# Patient Record
Sex: FEMALE | Race: BLACK OR AFRICAN AMERICAN | Employment: FULL TIME | URBAN - METROPOLITAN AREA
[De-identification: names, ages, dates, MRNs, and addresses within clinical notes are randomized per-mention and may not be internally consistent; named-entity substitution may affect disease eponyms.]

---

## 2017-05-30 ENCOUNTER — APPOINTMENT (OUTPATIENT)
Dept: PHYSICAL THERAPY | Age: 57
End: 2017-05-30

## 2017-06-07 ENCOUNTER — HOSPITAL ENCOUNTER (OUTPATIENT)
Dept: PHYSICAL THERAPY | Age: 57
Discharge: HOME | End: 2017-06-07
Payer: COMMERCIAL

## 2017-06-07 PROCEDURE — 97110 THERAPEUTIC EXERCISES: CPT | Performed by: PHYSICAL THERAPIST

## 2017-06-07 PROCEDURE — 97162 PT EVAL MOD COMPLEX 30 MIN: CPT | Performed by: PHYSICAL THERAPIST

## 2017-06-07 PROCEDURE — 97140 MANUAL THERAPY 1/> REGIONS: CPT | Performed by: PHYSICAL THERAPIST

## 2017-06-07 NOTE — PROGRESS NOTES
[facility]              [address]        [place], [address] [place]   [phone] fax    Plan of Care/ Statement of Necessity for Physical Therapy Services  Patient name: Adiel Capps Start of Care: 2017   Referral source: [place], MD : 1960    Medical Diagnosis: Headache [R51]  Cervicalgia [M54.2]   Onset Date: second week in May 2017    Treatment Diagnosis: cervicalgia   Prior Hospitalization: see medical history Provider#: [id number]   Medications: Verified on Patient summary List    Comorbidities: back pain, BMI > 30, HBP   Prior Level of Function: Independent w/ ADLs, not limited w/ neck pain or headaches     The Plan of Care and following information is based on the information from the initial evaluation. Assessment/ key information: Pt is a 62 y/o female w/ c/o increased pain in the neck and back of her head beginning the second week in May 2017 w/o known VIRI. Reports the pain gradually increased over the course of the day. Pain now increases w/ looking to the R and decreases w/ BC powder. Reports she had x-rays and it showed arthritis. Denies previous treatment for this condition and no history of neck injury or involvement. Pt presents sitting w/ rounded shoulders and slight L rotation in the cervical spine. C/S AROM flex and ext [facility] but painful at EROM, R SB 28 deg, L SB 35 deg, R rot 54 deg, L rot 69 deg. Cervical retraction limited 75% and painful. B shoulder AROM WFL but pulling is reported throughout the neck w/ all planes. Increased mm tightness and myofascial restriction throughout the cervical muscles and upper thoracic region. Poor cervical segmental mobility noted. Pt will benefit from skilled PT to address the deficits and progress with pt goals.      Evaluation Complexity History MEDIUM  Complexity : 1-2 comorbidities / personal factors will impact the outcome/ POC ; Examination MEDIUM Complexity : 3 Standardized tests and measures addressing body structure, function, activity limitation and / or participation in recreation  ;Presentation MEDIUM Complexity : Evolving with changing characteristics  ; Clinical Decision Making MEDIUM Complexity : FOTO score of 26-74  Overall Complexity Rating: MEDIUM  Problem List: pain affecting function, decrease ROM, decrease strength, decrease ADL/ functional abilitiies, decrease activity tolerance and decrease flexibility/ joint mobility   Treatment Plan may include any combination of the following: Therapeutic exercise, Therapeutic activities, Neuromuscular re-education, Physical agent/modality, Manual therapy, Patient education and Functional mobility training  Patient / Family readiness to learn indicated by: asking questions, trying to perform skills and interest  Persons(s) to be included in education: patient (P)  Barriers to Learning/Limitations: None  Patient Goal (s): anything to help  Patient Self Reported Health Status: fair  Rehabilitation Potential: good    Short Term Goals: To be accomplished in 1 weeks:  1. Pt will be independent and complaint w/ HEP to progress gains in PT.   2. Pt will increase sitting posture to good w/ no L rotation of the c/s for ease w/ checking blind spots while driving. Long Term Goals: To be accomplished in 4 weeks:  1. Pt will increase FOTO to > or = to 63 to demo improved function. 2. Pt will increase cervical AROM into R SB and R rotation by > or = to 8-10 deg for ease w/ driving. 3. Pt will increase cervical retraction by > or = to 25% for improved posture and decreased pain. 4. Pt will report > or = to 60% improvement in symptoms for ease w/ return to pain free ADLs. Frequency / Duration: Patient to be seen 3 times per week for 4 weeks.     Patient/ Caregiver education and instruction: Diagnosis, prognosis, activity modification and exercises   [x]  Plan of care has been reviewed with HARDEEP Vera, PT 6/7/2017 12:47 PM  ________________________________________________________________________    I certify that the above Therapy Services are being furnished while the patient is under my care. I agree with the treatment plan and certify that this therapy is necessary.     [de-identified] Signature:____________________  Date:____________Time: _________    Please sign and return to [facility] [address]        [place], [address] [place]   [phone] fax

## 2017-06-07 NOTE — PROGRESS NOTES
PT DAILY TREATMENT NOTE     Patient Name: Bailee Gregory  Date:2017  : 1960  [x]  Patient  Verified  Payor: Rocio Bello / Plan: Pura Roy / Product Type: HMO /    In time:1136  Out time:1206  Total Treatment Time (min): 30  Visit #: 1 of 12    Treatment Area: Headache [R51]  Cervicalgia [M54.2]    SUBJECTIVE  Pain Level (0-10 scale): 5/10  Any medication changes, allergies to medications, adverse drug reactions, diagnosis change, or new procedure performed?: [x] No    [] Yes (see summary sheet for update)  Subjective functional status/changes:   [] No changes reported  HPI: Pt c/o increased pain in the neck and back of her head beginning the second week in May 2017 w/o known VIRI. Reports the pain gradually increased over the course of the day. Pain now increases w/ looking to the R and decreases w/ BC powder. Reports she had x-rays and it showed arthritis. Denies previous treatment for this condition and no history of neck injury or involvement.      OBJECTIVE    Modality rationale: decrease pain and increase tissue extensibility to improve the patients ability to improve mobility and function   Min Type Additional Details    [] Estim:  []Unatt       []IFC  []Premod                        []Other:  []w/ice   []w/heat  Position:  Location:    [] Estim: []Att    []TENS instruct  []NMES                    []Other:  []w/US   []w/ice   []w/heat  Position:  Location:    []  Traction: [] Cervical       []Lumbar                       [] Prone          []Supine                       []Intermittent   []Continuous Lbs:  [] before manual  [] after manual    []  Ultrasound: []Continuous   [] Pulsed                           []1MHz   []3MHz W/cm2:  Location:    []  Iontophoresis with dexamethasone         Location: [] Take home patch   [] In clinic   8 (w/ HEP instruction) []  Ice     [x]  heat  []  Ice massage  []  Laser   []  Anodyne Position: supine  Location: neck    []  Laser with stim  []  Other: Position:  Location:    []  Vasopneumatic Device Pressure:       [] lo [] med [] hi   Temperature: [] lo [] med [] hi   [] Skin assessment post-treatment:  []intact []redness- no adverse reaction    []redness  adverse reaction:     12 min [x]Eval                  []Re-Eval       8 min Therapeutic Exercise:  [] See flow sheet : instructed in and demo'd HEP   Rationale: increase ROM, improve coordination and increase proprioception to improve the patients ability to decrease pain and improve activity tolerance     min Therapeutic Activity:  []  See flow sheet :   Rationale:  to improve the patients ability to       min Neuromuscular Re-education:  []  See flow sheet :   Rationale:   to improve the patients ability to     10 min Manual Therapy:  TPR/STM/MFR to B UT, LS, R scalenes, paraspinals, gentle c/s PA's, manual str to R UT   Rationale: decrease pain, increase ROM, increase tissue extensibility, decrease trigger points and increase postural awareness to improve mobiltiy and activity tolerance      min Gait Training:  ___ feet with ___ device on level surfaces with ___ level of assist   Rationale: With   [] TE   [] TA   [] neuro   [] other: Patient Education: [x] Review HEP    [] Progressed/Changed HEP based on:   [] positioning   [] body mechanics   [] transfers   [] heat/ice application    [] other:      Other Objective/Functional Measures: Pt presents sitting w/ rounded shoulders and slight L rotation in the cervical spine. C/S AROM flex and ext [facility] but painful at EROM, R SB 28 deg, L SB 35 deg, R rot 54 deg, L rot 69 deg. Cervical retraction limited 75% and painful. B shoulder AROM WFL but pulling is reported throughout the neck w/ all planes. Increased mm tightness and myofascial restriction throughout the cervical muscles and upper thoracic region. Poor cervical segmental mobility noted.      Pain Level (0-10 scale) post treatment: 3/10    ASSESSMENT/Changes in Function: Focus on improving cervical AROM and posture to decrease pain. Patient will continue to benefit from skilled PT services to modify and progress therapeutic interventions, address functional mobility deficits, address ROM deficits, address strength deficits, analyze and address soft tissue restrictions, analyze and cue movement patterns, analyze and modify body mechanics/ergonomics, assess and modify postural abnormalities and instruct in home and community integration to attain remaining goals. [x]  See Plan of Care  []  See progress note/recertification  []  See Discharge Summary         Progress towards goals / Updated goals:  Short Term Goals: To be accomplished in 1 weeks:  1. Pt will be independent and complaint w/ HEP to progress gains in PT. At eval: initiated HEP  2. Pt will increase sitting posture to good w/ no L rotation of the c/s for ease w/ checking blind spots while driving. At eval:Pt presents sitting w/ rounded shoulders and slight L rotation in the cervical spine   Long Term Goals: To be accomplished in 4 weeks:  1. Pt will increase FOTO to > or = to 63 to demo improved function. At eval: FOTO = 41  2. Pt will increase cervical AROM into R SB and R rotation by > or = to 8-10 deg for ease w/ driving. At eval: AROM R SB = 28 deg, R rotation = 54 deg  3. Pt will increase cervical retraction by > or = to 25% for improved posture and decreased pain. At eval: cervical retraction is limited 75% and painful   4. Pt will report > or = to 60% improvement in symptoms for ease w/ return to pain free ADLs.    At eval: 0%    PLAN  []  Upgrade activities as tolerated     []  Continue plan of care  []  Update interventions per flow sheet       []  Discharge due to:_  [x]  Other:_      Justine San, PT 6/7/2017  12:28 PM    Future Appointments  Date Time Provider Mag Alfredo   6/9/2017 3:30 PM Mary Ann Vidales PTA [facility]   6/12/2017 4:30 PM Catrina Dolan PTA [facility]   6/14/2017 4:30 PM Catrina Dolan PTA [facility]   6/16/2017 4:30 PM Mary Ann Vidales, PTA [facility]   6/19/2017 4:30 PM Catrina E Laws, PTA [facility]   6/21/2017 4:30 PM Catrina E Laws, PTA [facility]   6/23/2017 4:00 PM [facility] PT SUFFOLK 1 [facility]   6/26/2017 3:30 PM Catrina E Laws, PTA [facility]   6/28/2017 3:30 PM Catrina E Laws, PTA [facility]   6/30/2017 3:30 PM Catrina E Laws, PTA [facility]   7/3/2017 4:30 PM Catrina E Laws, PTA [facility]   7/5/2017 4:30 PM Catrina E Laws, PTA [facility]   7/7/2017 4:30 PM Catrina E Laws, PTA [facility]

## 2017-06-09 ENCOUNTER — HOSPITAL ENCOUNTER (OUTPATIENT)
Dept: PHYSICAL THERAPY | Age: 57
Discharge: HOME | End: 2017-06-09
Payer: COMMERCIAL

## 2017-06-09 PROCEDURE — 97110 THERAPEUTIC EXERCISES: CPT

## 2017-06-09 PROCEDURE — 97140 MANUAL THERAPY 1/> REGIONS: CPT

## 2017-06-09 NOTE — PROGRESS NOTES
PT DAILY TREATMENT NOTE     Patient Name: Lavelle Gray  Date:2017  : 1960  [x]  Patient  Verified  Payor: Johnathan Coates / Plan: Liu Son / Product Type: HMO /    In time:3:14  Out time:3:48  Total Treatment Time (min): 34  Visit #: 2 of 12    Treatment Area: Headache [R51]  Cervicalgia [M54.2]    SUBJECTIVE  Pain Level (0-10 scale): 1  Any medication changes, allergies to medications, adverse drug reactions, diagnosis change, or new procedure performed?: [x] No    [] Yes (see summary sheet for update)  Subjective functional status/changes:   [] No changes reported  Pt reports she has been feeling much better after her first session. OBJECTIVE        Min Type Additional Details    [] Estim:  []Unatt       []IFC  []Premod                        []Other:  []w/ice   []w/heat  Position:  Location:    [] Estim: []Att    []TENS instruct  []NMES                    []Other:  []w/US   []w/ice   []w/heat  Position:  Location:    []  Traction: [] Cervical       []Lumbar                       [] Prone          []Supine                       []Intermittent   []Continuous Lbs:  [] before manual  [] after manual    []  Ultrasound: []Continuous   [] Pulsed                           []1MHz   []3MHz W/cm2:  Location:    []  Iontophoresis with dexamethasone         Location: [] Take home patch   [] In clinic    []  Ice     []  heat  []  Ice massage  []  Laser   []  Anodyne Position:  Location:    []  Laser with stim  []  Other:  Position:  Location:    []  Vasopneumatic Device Pressure:       [] lo [] med [] hi   Temperature: [] lo [] med [] hi   [] Skin assessment post-treatment:  []intact []redness- no adverse reaction    []redness  adverse reaction:       24 min Therapeutic Exercise:  [x] See flow sheet :   Rationale: increase ROM and increase strength to improve the patients ability to increase tolerance to activities.        10 min Manual Therapy:  Per flow sheet   Rationale: decrease pain, increase ROM, increase tissue extensibility and decrease trigger points to increase tolerance to activities. With   [] TE   [] TA   [] neuro   [] other: Patient Education: [x] Review HEP    [] Progressed/Changed HEP based on:   [] positioning   [] body mechanics   [] transfers   [] heat/ice application    [] other:      Other Objective/Functional Measures: Pt reports that she has been performing HEP. Pain Level (0-10 scale) post treatment: 1    ASSESSMENT/Changes in Function: continue per POC. Patient will continue to benefit from skilled PT services to modify and progress therapeutic interventions, address functional mobility deficits, address ROM deficits, address strength deficits and analyze and address soft tissue restrictions to attain remaining goals. []  See Plan of Care  []  See progress note/recertification  []  See Discharge Summary         Progress towards goals / Updated goals:  Short Term Goals: To be accomplished in 1 weeks:  1. Pt will be independent and complaint w/ HEP to progress gains in PT. At eval: initiated HEP  Current: Goal met: Pt reports performing HEP. 6/9/17  2. Pt will increase sitting posture to good w/ no L rotation of the c/s for ease w/ checking blind spots while driving. At eval:Pt presents sitting w/ rounded shoulders and slight L rotation in the cervical spine   Long Term Goals: To be accomplished in 4 weeks:  1. Pt will increase FOTO to > or = to 63 to demo improved function. At eval: FOTO = 41  2. Pt will increase cervical AROM into R SB and R rotation by > or = to 8-10 deg for ease w/ driving. At eval: AROM R SB = 28 deg, R rotation = 54 deg  3. Pt will increase cervical retraction by > or = to 25% for improved posture and decreased pain. At eval: cervical retraction is limited 75% and painful   4. Pt will report > or = to 60% improvement in symptoms for ease w/ return to pain free ADLs.    At eval: 0%    PLAN  []  Upgrade activities as tolerated     [x] Continue plan of care  []  Update interventions per flow sheet       []  Discharge due to:_  []  Other:_      Rito Lund, PTA 6/9/2017  3:36 PM    Future Appointments  Date Time Provider Mag Alfredo   6/12/2017 4:30 PM Catrina E Laws, PTA [facility]   6/14/2017 4:30 PM Catrina E Laws, PTA [facility]   6/16/2017 4:30 PM Laurance Pyle [facility]   6/19/2017 4:30 PM Catrina E Laws, PTA [facility]   6/21/2017 4:30 PM Catrina E Laws, PTA [facility]   6/23/2017 4:00 PM [facility] PT SUFFOLK 1 [facility]   6/26/2017 3:30 PM Catrina E Laws, PTA [facility]   6/28/2017 3:30 PM Catrina E Laws, PTA [facility]   6/30/2017 3:30 PM Catrina E Laws, PTA [facility]   7/3/2017 4:30 PM Catrina E Laws, PTA [facility]   7/5/2017 4:30 PM Catrina E Laws, PTA [facility]   7/7/2017 4:30 PM Catrina E Laws, PTA [facility]

## 2017-06-12 ENCOUNTER — HOSPITAL ENCOUNTER (OUTPATIENT)
Dept: PHYSICAL THERAPY | Age: 57
Discharge: HOME | End: 2017-06-12
Payer: COMMERCIAL

## 2017-06-12 PROCEDURE — 97110 THERAPEUTIC EXERCISES: CPT

## 2017-06-12 PROCEDURE — 97140 MANUAL THERAPY 1/> REGIONS: CPT

## 2017-06-12 NOTE — PROGRESS NOTES
PT DAILY TREATMENT NOTE     Patient Name: Afua Bernal  Date:2017  : 1960  [x]  Patient  Verified  Payor: Tru Dunbar / Plan: Cleveland Johnson / Product Type: HMO /    In time: 4:26  Out time:5:10  Total Treatment Time (min): 44  Visit #: 3 of 12    Treatment Area: Headache [R51]  Cervicalgia [M54.2]    SUBJECTIVE  Pain Level (0-10 scale): 0/10  Any medication changes, allergies to medications, adverse drug reactions, diagnosis change, or new procedure performed?: [x] No    [] Yes (see summary sheet for update)  Subjective functional status/changes:   [] No changes reported  Pt denies pain at this time. OBJECTIVE    Modality rationale:    Min Type Additional Details    [] Estim:  []Unatt       []IFC  []Premod                        []Other:  []w/ice   []w/heat  Position:  Location:    [] Estim: []Att    []TENS instruct  []NMES                    []Other:  []w/US   []w/ice   []w/heat  Position:  Location:    []  Traction: [] Cervical       []Lumbar                       [] Prone          []Supine                       []Intermittent   []Continuous Lbs:  [] before manual  [] after manual    []  Ultrasound: []Continuous   [] Pulsed                           []1MHz   []3MHz W/cm2:  Location:    []  Iontophoresis with dexamethasone         Location: [] Take home patch   [] In clinic    []  Ice     []  heat  []  Ice massage  []  Laser   []  Anodyne Position:  Location:    []  Laser with stim  []  Other:  Position:  Location:    []  Vasopneumatic Device Pressure:       [] lo [] med [] hi   Temperature: [] lo [] med [] hi   [] Skin assessment post-treatment:  []intact []redness- no adverse reaction    []redness  adverse reaction:     34 min Therapeutic Exercise:  [x] See flow sheet :   Rationale: increase ROM and increase strength to improve the patients ability to perform ADLs.      10 min Manual Therapy:  Per flow sheet   Rationale: decrease pain, increase ROM and increase tissue extensibility to increase ease of ADLs. With   [] TE   [] TA   [] neuro   [] other: Patient Education: [x] Review HEP    [] Progressed/Changed HEP based on:   [] positioning   [] body mechanics   [] transfers   [] heat/ice application    [] other:      Other Objective/Functional Measures: Cervical AROM: R SB 32 degrees, R rot 65 degrees. Pain Level (0-10 scale) post treatment: 0/10    ASSESSMENT/Changes in Function: Cont per POC. Patient will continue to benefit from skilled PT services to modify and progress therapeutic interventions, address functional mobility deficits, address ROM deficits and address strength deficits to attain remaining goals. []  See Plan of Care  []  See progress note/recertification  []  See Discharge Summary         Progress towards goals / Updated goals:  Short Term Goals: To be accomplished in 1 weeks:  1. Pt will be independent and complaint w/ HEP to progress gains in PT. At eval: initiated HEP  Current: Goal met: Pt reports performing HEP. 6/9/17  2. Pt will increase sitting posture to good w/ no L rotation of the c/s for ease w/ checking blind spots while driving. At eval:Pt presents sitting w/ rounded shoulders and slight L rotation in the cervical spine   Long Term Goals: To be accomplished in 4 weeks:  1. Pt will increase FOTO to > or = to 63 to demo improved function. At eval: FOTO = 41  2. Pt will increase cervical AROM into R SB and R rotation by > or = to 8-10 deg for ease w/ driving. At eval: AROM R SB = 28 deg, R rotation = 54 deg  Current: Progressing, R SB 32 degrees, R rot 65 degrees. 6/12/17  3. Pt will increase cervical retraction by > or = to 25% for improved posture and decreased pain. At eval: cervical retraction is limited 75% and painful   4. Pt will report > or = to 60% improvement in symptoms for ease w/ return to pain free ADLs.    At eval: 0%    PLAN  []  Upgrade activities as tolerated     [x]  Continue plan of care  []  Update interventions per flow sheet       []  Discharge due to:_  []  Other:_      Catrina E Laws, PTA 6/12/2017  5:12 PM    Future Appointments  Date Time Provider Mag Alfredo   6/14/2017 4:30 PM Catrina E Laws, PTA [facility]   6/16/2017 4:30 PM Cynda River [facility]   6/19/2017 4:30 PM Catrina E Laws, PTA [facility]   6/21/2017 4:30 PM Catrina E Laws, PTA [facility]   6/23/2017 4:00 PM [facility] PT SUFFOLK 1 [facility]   6/26/2017 3:30 PM Catrina E Laws, PTA [facility]   6/28/2017 3:30 PM Catrina E Laws, PTA [facility]   6/30/2017 3:30 PM Catrina E Laws, PTA [facility]   7/3/2017 4:30 PM Catrina E Laws, PTA [facility]   7/5/2017 4:30 PM Catrina E Laws, PTA [facility]   7/7/2017 4:30 PM Catrina E Laws, PTA [facility]

## 2017-06-16 ENCOUNTER — HOSPITAL ENCOUNTER (OUTPATIENT)
Dept: PHYSICAL THERAPY | Age: 57
Discharge: HOME | End: 2017-06-16
Payer: COMMERCIAL

## 2017-06-16 PROCEDURE — 97140 MANUAL THERAPY 1/> REGIONS: CPT

## 2017-06-16 PROCEDURE — 97110 THERAPEUTIC EXERCISES: CPT

## 2017-06-16 NOTE — PROGRESS NOTES
PT DAILY TREATMENT NOTE 3    Patient Name: Ronit Gold  Date:2017  : 1960  [x]  Patient  Verified  Payor: Heather Morreller / Plan: Danielle Cough / Product Type: HMO /    In time:4:23  Out time: 4:54  Total Treatment Time (min): 31  Visit #: 4 of 12    Treatment Area: Headache [R51]  Cervicalgia [M54.2]    SUBJECTIVE  Pain Level (0-10 scale): 0  Any medication changes, allergies to medications, adverse drug reactions, diagnosis change, or new procedure performed?: [x] No    [] Yes (see summary sheet for update)  Subjective functional status/changes:   [] No changes reported  Patient reports she has not had any headaches recently. OBJECTIVE  21 min Therapeutic Exercise:  [x] See flow sheet :   Rationale: increase ROM, increase strength and improve coordination to improve the patients ability to increase ease with ADLs    10 min Manual Therapy:  SOR, DTM/TPR to bilateral UT/LS and c/s parapsinals   Rationale: decrease pain, increase ROM and increase tissue extensibility to ease ADL tolerance           With   [] TE   [] TA   [] neuro   [] other: Patient Education: [x] Review HEP    [] Progressed/Changed HEP based on:   [] positioning   [] body mechanics   [] transfers   [] heat/ice application    [] other:      Other Objective/Functional Measures:   Good t/s mobility with open books     Pain Level (0-10 scale) post treatment: 0    ASSESSMENT/Changes in Function: Patient with good tolerance to therex, noted good carryover with cues, and reports no pain today. Will continue per POC. Patient will continue to benefit from skilled PT services to modify and progress therapeutic interventions, address functional mobility deficits, address ROM deficits, address strength deficits, analyze and address soft tissue restrictions, analyze and cue movement patterns, analyze and modify body mechanics/ergonomics and assess and modify postural abnormalities to attain remaining goals.      []  See Plan of Care  []  See progress note/recertification  []  See Discharge Summary            Progress towards goals / Updated goals:  Short Term Goals: To be accomplished in 1 weeks:  1. Pt will be independent and complaint w/ HEP to progress gains in PT. At eval: initiated HEP  Current: Goal met: Pt reports performing HEP. 6/9/17  2. Pt will increase sitting posture to good w/ no L rotation of the c/s for ease w/ checking blind spots while driving. At eval:Pt presents sitting w/ rounded shoulders and slight L rotation in the cervical spine   Long Term Goals: To be accomplished in 4 weeks:  1. Pt will increase FOTO to > or = to 63 to demo improved function. At eval: FOTO = 41  2. Pt will increase cervical AROM into R SB and R rotation by > or = to 8-10 deg for ease w/ driving. At eval: AROM R SB = 28 deg, R rotation = 54 deg  Current: Progressing, R SB 32 degrees, R rot 65 degrees. 6/12/17  3. Pt will increase cervical retraction by > or = to 25% for improved posture and decreased pain. At eval: cervical retraction is limited 75% and painful   4. Pt will report > or = to 60% improvement in symptoms for ease w/ return to pain free ADLs.    At eval: 0%    PLAN  []  Upgrade activities as tolerated     [x]  Continue plan of care  []  Update interventions per flow sheet       []  Discharge due to:_  []  Other:_      Lacie Haines 6/16/2017  1:47 PM    Future Appointments  Date Time Provider Mag Alfredo   6/16/2017 4:30 PM H[facility]   6/19/2017 4:30 PM Catrina E Laws, PTA [facility]   6/21/2017 4:30 PM Catrina E Laws, PTA [facility]   6/23/2017 4:00 PM Sudhakar Eckert, PT [facility]   6/26/2017 3:30 PM Catrina E Laws, PTA [facility]   6/28/2017 3:30 PM Catrina E Laws, PTA [facility]   6/30/2017 3:30 PM Catrina E Laws, PTA [facility]   7/3/2017 4:30 PM Catrina E Laws, PTA [facility]   7/5/2017 4:30 PM Catrina E Laws, PTA [facility]   7/7/2017 4:30 PM Catrina E Laws, PTA [facility]

## 2017-06-19 ENCOUNTER — HOSPITAL ENCOUNTER (OUTPATIENT)
Dept: PHYSICAL THERAPY | Age: 57
Discharge: HOME | End: 2017-06-19
Payer: COMMERCIAL

## 2017-06-19 PROCEDURE — 97110 THERAPEUTIC EXERCISES: CPT

## 2017-06-19 PROCEDURE — 97140 MANUAL THERAPY 1/> REGIONS: CPT

## 2017-06-19 NOTE — PROGRESS NOTES
PT DAILY TREATMENT NOTE     Patient Name: Josiane Gongora  Date:2017  : 1960  [x]  Patient  Verified  Payor: Claudene Reel / Plan: Yoko Peña / Product Type: HMO /    In time: 3:30  Out time:4:03  Total Treatment Time (min): 33  Visit #: 5 of 12    Treatment Area: Headache [R51]  Cervicalgia [M54.2]    SUBJECTIVE  Pain Level (0-10 scale): 2/10  Any medication changes, allergies to medications, adverse drug reactions, diagnosis change, or new procedure performed?: [x] No    [] Yes (see summary sheet for update)  Subjective functional status/changes:   [] No changes reported  Pt states she is a little sore. OBJECTIVE    Modality rationale:    Min Type Additional Details    [] Estim:  []Unatt       []IFC  []Premod                        []Other:  []w/ice   []w/heat  Position:  Location:    [] Estim: []Att    []TENS instruct  []NMES                    []Other:  []w/US   []w/ice   []w/heat  Position:  Location:    []  Traction: [] Cervical       []Lumbar                       [] Prone          []Supine                       []Intermittent   []Continuous Lbs:  [] before manual  [] after manual    []  Ultrasound: []Continuous   [] Pulsed                           []1MHz   []3MHz W/cm2:  Location:    []  Iontophoresis with dexamethasone         Location: [] Take home patch   [] In clinic    []  Ice     []  heat  []  Ice massage  []  Laser   []  Anodyne Position:  Location:    []  Laser with stim  []  Other:  Position:  Location:    []  Vasopneumatic Device Pressure:       [] lo [] med [] hi   Temperature: [] lo [] med [] hi   [] Skin assessment post-treatment:  []intact []redness- no adverse reaction    []redness  adverse reaction:     23 min Therapeutic Exercise:  [x] See flow sheet :   Rationale: increase ROM and increase strength to improve the patients ability to perform ADLs.      10 min Manual Therapy:  Per flow sheet   Rationale: decrease pain, increase ROM and increase tissue extensibility to increase ease of ADLs. With   [] TE   [] TA   [] neuro   [] other: Patient Education: [x] Review HEP    [] Progressed/Changed HEP based on:   [] positioning   [] body mechanics   [] transfers   [] heat/ice application    [] other:      Other Objective/Functional Measures: VCs for postural awareness. Pain Level (0-10 scale) post treatment: 0/10    ASSESSMENT/Changes in Function:  Cont per POC. Patient will continue to benefit from skilled PT services to modify and progress therapeutic interventions, address functional mobility deficits, address ROM deficits and address strength deficits to attain remaining goals. []  See Plan of Care  []  See progress note/recertification  []  See Discharge Summary         Progress towards goals / Updated goals:  Short Term Goals: To be accomplished in 1 weeks:  1. Pt will be independent and complaint w/ HEP to progress gains in PT. At eval: initiated HEP  Current: Goal met: Pt reports performing HEP. 6/9/17  2. Pt will increase sitting posture to good w/ no L rotation of the c/s for ease w/ checking blind spots while driving. At eval:Pt presents sitting w/ rounded shoulders and slight L rotation in the cervical spine   Long Term Goals: To be accomplished in 4 weeks:  1. Pt will increase FOTO to > or = to 63 to demo improved function. At eval: FOTO = 41  2. Pt will increase cervical AROM into R SB and R rotation by > or = to 8-10 deg for ease w/ driving. At eval: AROM R SB = 28 deg, R rotation = 54 deg  Current: Progressing, R SB 32 degrees, R rot 65 degrees. 6/12/17  3. Pt will increase cervical retraction by > or = to 25% for improved posture and decreased pain. At eval: cervical retraction is limited 75% and painful   4. Pt will report > or = to 60% improvement in symptoms for ease w/ return to pain free ADLs.    At eval: 0%    PLAN  []  Upgrade activities as tolerated     [x]  Continue plan of care  []  Update interventions per flow sheet       []  Discharge due to:_  []  Other:_      Catrina E Laws, PTA 6/19/2017  5:00 PM    Future Appointments  Date Time Provider Mag Alfredo   6/21/2017 4:30 PM Catrina E Laws, PTA [facility]   6/23/2017 4:00 PM Shahnaz Pineda, PT [facility]   6/26/2017 3:30 PM Catrina E Laws, PTA [facility]   6/28/2017 3:30 PM Catrina E Laws, PTA [facility]   6/30/2017 3:30 PM Catrina E Laws, PTA [facility]   7/3/2017 4:30 PM Catrina E Laws, PTA [facility]   7/5/2017 4:30 PM Catrina E Laws, PTA [facility]   7/7/2017 4:30 PM Catrina E Laws, PTA [facility]

## 2017-06-21 ENCOUNTER — APPOINTMENT (OUTPATIENT)
Dept: PHYSICAL THERAPY | Age: 57
End: 2017-06-21
Payer: COMMERCIAL

## 2017-06-23 ENCOUNTER — HOSPITAL ENCOUNTER (OUTPATIENT)
Dept: PHYSICAL THERAPY | Age: 57
Discharge: HOME | End: 2017-06-23
Payer: COMMERCIAL

## 2017-06-23 PROCEDURE — 97110 THERAPEUTIC EXERCISES: CPT

## 2017-06-23 NOTE — PROGRESS NOTES
PT DAILY TREATMENT NOTE     Patient Name: Navdeep Frye  Date:2017  : 1960  [x]  Patient  Verified  Payor: Susan Rich / Plan: Jessica Vilchis / Product Type: HMO /    In time:356  Out time:420  Total Treatment Time (min): 24  Visit #: 6 of 12    Treatment Area: Headache [R51]  Cervicalgia [M54.2]    SUBJECTIVE  Pain Level (0-10 scale): 0/10  Any medication changes, allergies to medications, adverse drug reactions, diagnosis change, or new procedure performed?: [x] No    [] Yes (see summary sheet for update)  Subjective functional status/changes:   [] No changes reported  Patient denies any pain over the last week with patient denying the use of pain medication with regular performance of prescribed HEP and use of heat. Patient as well reports full cessation of headaches. OBJECTIVE    24 min Therapeutic Exercise:  [x] See flow sheet : Emphasis placed on improving cervical mobility and posture   Rationale: increase ROM and increase strength to improve the patients ability to drive  safely    With   [x] TE   [] TA   [] neuro   [] other: Patient Education: [x] Review HEP    [] Progressed/Changed HEP based on:   [] positioning   [] body mechanics   [] transfers   [] heat/ice application    [] other:      Other Objective/Functional Measures:     Cervical AROM   Flexion: 60 deg   Extension: 48 deg   L Sidebend: 50 deg   R Sidebend: 45 deg   L Rotation: 64 deg   R Rotation: 65 deg  Cervical Retraction: 50% AROM available    Pain Level (0-10 scale) post treatment: 0/10    ASSESSMENT/Changes in Function: At this time patient subjectively reports 100% improvement in cervical symptoms and headaches since the [facility] with patient with the desire to be discharged. Patient has met all goals. Patient denies sleep disturbances and subjectively reports no medication usage secondary to pain. Patient objectively demonstrates a significant improvement in cervical rotation AROM with patient demonstrating neutral cervical position in static sitting. Patient reports performance of prescribed HEP 2x/day. Patient educated to continue to perform HEP as prescribed, with proper form reviewed, and to continue to use heat application PRN. Patient in agreement with plan of care and without further questions. []  See Plan of Care  []  See progress note/recertification  [x]  See Discharge Summary         Progress towards goals / Updated goals:    Short Term Goals: To be accomplished in 1 weeks:  1. Pt will be independent and complaint w/ HEP to progress gains in PT. At eval: Initiated HEP  At DC: Met, Pt reports performing HEP. 2. Pt will increase sitting posture to good w/ no L rotation of the c/s for ease w/ checking blind spots while driving. At eval: Pt presents sitting w/ rounded shoulders and slight L rotation in the cervical spine   At DC: Met, Neutral cervical rotation visualized in static sitting     Long Term Goals: To be accomplished in 4 weeks:  1. Pt will increase FOTO to > or = to 63 to demo improved function. At eval: FOTO = 41  At DC: Met, FOTO = 76  2. Pt will increase cervical AROM into R SB and R rotation by > or = to 8-10 deg for ease w/ driving. At eval: AROM R SB = 28 deg, R rotation = 54 deg  At DC: Met, R SB 45 degrees, R rot 65 degrees. 3. Pt will increase cervical retraction by > or = to 25% for improved posture and decreased pain. At eval: Cervical retraction is limited 75% and painful   At DC: Progressing, Cervical retraction through 50% ROM without pain  4. Pt will report > or = to 60% improvement in symptoms for ease w/ return to pain free ADLs.    At eval: 0%  At DC: Met, 100% Improvement since [facility]    PLAN  []  Upgrade activities as tolerated     []  Continue plan of care  []  Update interventions per flow sheet       [x]  Discharge due to:Patient has met/is progressing appropriately towards all goals  []  Other:Gem Rivas, PT 6/23/2017  3:53 PM    Future Appointments  Date Time Provider Mga Alfredo   6/23/2017 4:00 PM Frandy Faith [facility]   6/26/2017 3:30 PM Catrina E Laws, PTA [facility]   6/28/2017 3:30 PM Catrina E Laws, PTA [facility]   6/30/2017 3:30 PM Catrina E Laws, PTA [facility]   7/3/2017 4:30 PM Catrina E Laws, PTA [facility]   7/5/2017 4:30 PM Catrina E Laws, PTA [facility]   7/7/2017 4:30 PM Catrina E Laws, PTA [facility]

## 2017-06-23 NOTE — PROGRESS NOTES
[facility]              [address]        [place], [address] [place]   [phone] fax    Discharge Summary  Patient name: Jax Golden Start of Care: 2017   Referral source: [place], MD : 1960   Medical/Treatment Diagnosis: Headache [R51]  Cervicalgia [M54.2] Onset Date:Second week of May 2017     Prior Hospitalization: see medical history Provider#: [id number]   Medications: Verified on Patient Summary List    Comorbidities: back pain, BMI > 30, HBP   Prior Level of Function: Independent w/ ADLs, not limited w/ neck pain or headaches      Visits from Start of Care: 6    Missed Visits: 2  Reporting Period : 2017 to 2017    Summary of Care: At this time patient subjectively reports 100% improvement in cervical symptoms and headaches since the [facility] with patient with the desire to be discharged. Patient has met all goals. Patient denies sleep disturbances and subjectively reports no medication usage secondary to pain. Patient objectively demonstrates a significant improvement in cervical rotation AROM with patient demonstrating neutral cervical position in static sitting. Patient reports performance of prescribed HEP 2x/day. Patient educated to continue to perform HEP as prescribed, with proper form reviewed, and to continue to use heat application PRN. Patient in agreement with plan of care and without further questions. Short Term Goals: To be accomplished in 1 weeks:  1. Pt will be independent and complaint w/ HEP to progress gains in PT. At eval: Initiated HEP  At DC: Met, Pt reports performing HEP. 2. Pt will increase sitting posture to good w/ no L rotation of the c/s for ease w/ checking blind spots while driving.    At eval: Pt presents sitting w/ rounded shoulders and slight L rotation in the cervical spine   At DC: Met, Neutral cervical rotation visualized in static sitting     Long Term Goals: To be accomplished in 4 weeks: 1. Pt will increase FOTO to > or = to 63 to demo improved function. At eval: FOTO = 41  At DC: Met, FOTO = 76  2. Pt will increase cervical AROM into R SB and R rotation by > or = to 8-10 deg for ease w/ driving. At eval: AROM R SB = 28 deg, R rotation = 54 deg  At DC: Met, R SB 45 degrees, R rot 65 degrees. 3. Pt will increase cervical retraction by > or = to 25% for improved posture and decreased pain. At eval: Cervical retraction is limited 75% and painful   At DC: Progressing, Cervical retraction through 50% ROM without pain  4. Pt will report > or = to 60% improvement in symptoms for ease w/ return to pain free ADLs.    At eval: 0%  At DC: Met, 100% Improvement since [facility]    ASSESSMENT/RECOMMENDATIONS:  [x]Discontinue therapy: [x]Patient has reached or is progressing toward set goals      []Patient is non-compliant or has abdicated      []Due to lack of appreciable progress towards set 55 Ren Martinez, PT 6/23/2017 4:20 PM

## 2017-06-26 ENCOUNTER — APPOINTMENT (OUTPATIENT)
Dept: PHYSICAL THERAPY | Age: 57
End: 2017-06-26
Payer: COMMERCIAL

## 2017-06-28 ENCOUNTER — APPOINTMENT (OUTPATIENT)
Dept: PHYSICAL THERAPY | Age: 57
End: 2017-06-28
Payer: COMMERCIAL

## 2017-06-30 ENCOUNTER — APPOINTMENT (OUTPATIENT)
Dept: PHYSICAL THERAPY | Age: 57
End: 2017-06-30
Payer: COMMERCIAL

## 2017-07-03 ENCOUNTER — APPOINTMENT (OUTPATIENT)
Dept: PHYSICAL THERAPY | Age: 57
End: 2017-07-03

## 2017-07-05 ENCOUNTER — APPOINTMENT (OUTPATIENT)
Dept: PHYSICAL THERAPY | Age: 57
End: 2017-07-05

## 2017-07-07 ENCOUNTER — APPOINTMENT (OUTPATIENT)
Dept: PHYSICAL THERAPY | Age: 57
End: 2017-07-07